# Patient Record
Sex: MALE | Race: WHITE | NOT HISPANIC OR LATINO | Employment: OTHER | URBAN - METROPOLITAN AREA
[De-identification: names, ages, dates, MRNs, and addresses within clinical notes are randomized per-mention and may not be internally consistent; named-entity substitution may affect disease eponyms.]

---

## 2017-01-23 ENCOUNTER — GENERIC CONVERSION - ENCOUNTER (OUTPATIENT)
Dept: OTHER | Facility: OTHER | Age: 77
End: 2017-01-23

## 2017-01-23 DIAGNOSIS — I34.0 NONRHEUMATIC MITRAL VALVE INSUFFICIENCY: ICD-10-CM

## 2017-01-23 DIAGNOSIS — J44.9 CHRONIC OBSTRUCTIVE PULMONARY DISEASE (HCC): ICD-10-CM

## 2017-01-23 DIAGNOSIS — R91.8 OTHER NONSPECIFIC ABNORMAL FINDING OF LUNG FIELD: ICD-10-CM

## 2017-01-23 DIAGNOSIS — E78.5 HYPERLIPIDEMIA: ICD-10-CM

## 2017-01-23 DIAGNOSIS — K21.9 GASTRO-ESOPHAGEAL REFLUX DISEASE WITHOUT ESOPHAGITIS: ICD-10-CM

## 2017-01-23 DIAGNOSIS — I10 ESSENTIAL (PRIMARY) HYPERTENSION: ICD-10-CM

## 2017-01-23 DIAGNOSIS — I25.10 ATHEROSCLEROTIC HEART DISEASE OF NATIVE CORONARY ARTERY WITHOUT ANGINA PECTORIS: ICD-10-CM

## 2017-01-24 ENCOUNTER — GENERIC CONVERSION - ENCOUNTER (OUTPATIENT)
Dept: OTHER | Facility: OTHER | Age: 77
End: 2017-01-24

## 2017-01-24 LAB
A/G RATIO (HISTORICAL): 1.5 (ref 1.1–2.5)
ALBUMIN SERPL BCP-MCNC: 4.3 G/DL (ref 3.5–4.8)
ALP SERPL-CCNC: 103 IU/L (ref 39–117)
ALT SERPL W P-5'-P-CCNC: 22 IU/L (ref 0–44)
AMBIG ABBREV CMP14 DEFAULT (HISTORICAL): NORMAL
AMBIG ABBREV LP DEFAULT (HISTORICAL): NORMAL
AST SERPL W P-5'-P-CCNC: 23 IU/L (ref 0–40)
BASOPHILS # BLD AUTO: 0 X10E3/UL (ref 0–0.2)
BASOPHILS # BLD AUTO: 1 %
BILIRUB SERPL-MCNC: 0.4 MG/DL (ref 0–1.2)
BUN SERPL-MCNC: 17 MG/DL (ref 8–27)
BUN/CREA RATIO (HISTORICAL): 17 (ref 10–22)
CALCIUM SERPL-MCNC: 9.4 MG/DL (ref 8.6–10.2)
CHLORIDE SERPL-SCNC: 99 MMOL/L (ref 96–106)
CHOLEST SERPL-MCNC: 154 MG/DL (ref 100–199)
CO2 SERPL-SCNC: 24 MMOL/L (ref 18–29)
CREAT SERPL-MCNC: 1 MG/DL (ref 0.76–1.27)
DEPRECATED RDW RBC AUTO: 14.4 % (ref 12.3–15.4)
EGFR AFRICAN AMERICAN (HISTORICAL): 84 ML/MIN/1.73
EGFR-AMERICAN CALC (HISTORICAL): 73 ML/MIN/1.73
EOSINOPHIL # BLD AUTO: 0.3 X10E3/UL (ref 0–0.4)
EOSINOPHIL # BLD AUTO: 4 %
GLUCOSE SERPL-MCNC: 92 MG/DL (ref 65–99)
HBA1C MFR BLD HPLC: 5.9 % (ref 4.8–5.6)
HCT VFR BLD AUTO: 39.5 % (ref 37.5–51)
HDLC SERPL-MCNC: 28 MG/DL
HGB BLD-MCNC: 13.7 G/DL (ref 12.6–17.7)
IMM.GRANULOCYTES (CD4/8) (HISTORICAL): 0 %
IMM.GRANULOCYTES (CD4/8) (HISTORICAL): 0 X10E3/UL (ref 0–0.1)
LDLC SERPL CALC-MCNC: 106 MG/DL (ref 0–99)
LYMPHOCYTES # BLD AUTO: 1.4 X10E3/UL (ref 0.7–3.1)
LYMPHOCYTES # BLD AUTO: 22 %
MCH RBC QN AUTO: 32.5 PG (ref 26.6–33)
MCHC RBC AUTO-ENTMCNC: 34.7 G/DL (ref 31.5–35.7)
MCV RBC AUTO: 94 FL (ref 79–97)
MONOCYTES # BLD AUTO: 0.6 X10E3/UL (ref 0.1–0.9)
MONOCYTES (HISTORICAL): 10 %
NEUTROPHILS # BLD AUTO: 4.1 X10E3/UL (ref 1.4–7)
NEUTROPHILS # BLD AUTO: 63 %
PLATELET # BLD AUTO: 390 X10E3/UL (ref 150–379)
POTASSIUM SERPL-SCNC: 4.8 MMOL/L (ref 3.5–5.2)
RBC (HISTORICAL): 4.21 X10E6/UL (ref 4.14–5.8)
SODIUM SERPL-SCNC: 139 MMOL/L (ref 134–144)
TOT. GLOBULIN, SERUM (HISTORICAL): 2.8 G/DL (ref 1.5–4.5)
TOTAL PROTEIN (HISTORICAL): 7.1 G/DL (ref 6–8.5)
TRIGL SERPL-MCNC: 102 MG/DL (ref 0–149)
VLDLC SERPL CALC-MCNC: 20 MG/DL (ref 5–40)
WBC # BLD AUTO: 6.4 X10E3/UL (ref 3.4–10.8)

## 2017-01-25 ENCOUNTER — ALLSCRIPTS OFFICE VISIT (OUTPATIENT)
Dept: OTHER | Facility: OTHER | Age: 77
End: 2017-01-25

## 2017-01-29 LAB
BACTERIA UR QL AUTO: ABNORMAL
BILIRUB UR QL STRIP: NEGATIVE
COLOR UR: YELLOW
COMMENT (HISTORICAL): CLEAR
CULTURE RESULT (HISTORICAL): ABNORMAL
FECAL OCCULT BLOOD DIAGNOSTIC (HISTORICAL): NEGATIVE
GLUCOSE (HISTORICAL): NEGATIVE
KETONES UR STRIP-MCNC: NEGATIVE MG/DL
LEUKOCYTE ESTERASE UR QL STRIP: ABNORMAL
MICROSCOPIC EXAMINATION (HISTORICAL): ABNORMAL
MISCELLANEOUS LAB TEST RESULT (HISTORICAL): ABNORMAL
MUCUS THREADS (HISTORICAL): PRESENT
NITRITE UR QL STRIP: NEGATIVE
NON-SQ EPI CELLS URNS QL MICRO: ABNORMAL /HPF
PH UR STRIP.AUTO: 6 [PH] (ref 5–7.5)
PROT UR STRIP-MCNC: NEGATIVE MG/DL
RBC (HISTORICAL): ABNORMAL /HPF
SP GR UR STRIP.AUTO: 1.02 (ref 1–1.03)
SUSCEP. REFLEX (HISTORICAL): ABNORMAL
URINALYSIS (UA) (HISTORICAL): ABNORMAL
UROBILINOGEN UR QL STRIP.AUTO: 0.2 EU/DL (ref 0.2–1)
WBC # BLD AUTO: ABNORMAL /HPF

## 2017-03-06 DIAGNOSIS — I65.29 OCCLUSION AND STENOSIS OF UNSPECIFIED CAROTID ARTERY: ICD-10-CM

## 2017-07-12 ENCOUNTER — ALLSCRIPTS OFFICE VISIT (OUTPATIENT)
Dept: OTHER | Facility: OTHER | Age: 77
End: 2017-07-12

## 2017-07-12 ENCOUNTER — TRANSCRIBE ORDERS (OUTPATIENT)
Dept: ADMINISTRATIVE | Facility: HOSPITAL | Age: 77
End: 2017-07-12

## 2017-07-12 ENCOUNTER — APPOINTMENT (OUTPATIENT)
Dept: RADIOLOGY | Facility: CLINIC | Age: 77
End: 2017-07-12
Payer: COMMERCIAL

## 2017-07-12 DIAGNOSIS — J44.9 CHRONIC OBSTRUCTIVE PULMONARY DISEASE (HCC): ICD-10-CM

## 2017-07-12 PROCEDURE — 71020 HB CHEST X-RAY 2VW FRONTAL&LATL: CPT

## 2017-07-19 ENCOUNTER — GENERIC CONVERSION - ENCOUNTER (OUTPATIENT)
Dept: OTHER | Facility: OTHER | Age: 77
End: 2017-07-19

## 2017-09-01 ENCOUNTER — GENERIC CONVERSION - ENCOUNTER (OUTPATIENT)
Dept: OTHER | Facility: OTHER | Age: 77
End: 2017-09-01

## 2017-10-28 NOTE — PROGRESS NOTES
Assessment    1  Hypertension (401 9) (I10)   2  Hyperlipidemia (272 4) (E78 5)   3  GERD without esophagitis (530 81) (K21 9)   4  Cerebrovascular accident (CVA) due to embolism (434 11) (I63 9)    Plan  GERD without esophagitis    · Protonix 40 MG Oral Tablet Delayed Release (Pantoprazole Sodium)  Hyperlipidemia    · Simvastatin 20 MG Oral Tablet  Hypertension    · Metoprolol Tartrate 50 MG Oral Tablet   · Begin a limited exercise program ; Status:Complete;   Done: 04KFY0756   · Begin or continue regular aerobic exercise  Gradually work up to at least 3 sessions of30 minutes of exercise a week ; Status:Complete;   Done: 45CFH5948   · Limit your use of alcohol to 2 drinks or cans of beer a day ; Status:Complete;   Done:27Jan2016   · Take your blood pressure twice a week  Record the numbers and bring them with you toyour appointments ; Status:Complete;   Done: 47EEH5663   · We recommend that you follow the Mediterranean diet ; Status:Complete;   Done:27Jan2016   · Call [phone] if: You become dizzy or lightheaded, especially when you stand upafter sitting for a while ; Status:Complete;   Done: 89UQR0415   · Call [phone] if: You develop double vision (see two of everything)  ;Status:Complete;   Done: 65FOS7705   · Call [phone] if: Your blood pressure is frequently higher than 140/90  ;Status:Complete;   Done: 27UVB5135   · Call 911 if: You experience a new kind of chest pain (angina) or pressure  ;Status:Complete;   Done: 57LMO6058   · Call 911 if: You have any symptoms of a stroke ; Status:Complete;   Done: 91IGX1559   · Seek Immediate Medical Attention if: You have a severe headache that will not go away  ;Status:Complete;   Done: 44OUH8165   · Seek Immediate Medical Attention if: Your blood pressure is greater than 250/120 for 2consecutive readings ; Status:Complete;   Done: 61YOH3788   · Follow-up visit in 6 months Evaluation and Treatment  Follow-up  Status: Hold For -Scheduling  Requested for: 83NAL7761    Discussion/Summary    79-year-old man with COPD hypertension recent CVAhas not been monitoring he is following with the cardiologistshows no neurologic sequelae of recent stroke his blood pressures controlledwill follow him along and see him every 6 months or sooner as neededreviewed his recent discharge summary diagnostic testing consultationsmake no changes in his overall medication or managementwas recently vaccinated with Prevnar 13 and the flu shot  Chief Complaint  3 month Checkup -- in [facility] earlier this month      History of Present Illness  79-year-old male here today one month following hospitalization for CVA and acute renal injuryhas returned home and is moderately self-sufficientis under treatment for the following ongoing problemsthis is a second stroke he takes medication is usually well-controlledhe is on a statin agent and his numbers are at goalreflux he takes a proton pump inhibitor and he is symptomatically controlledheart disease/carotid artery disease he follows with the cardiologistdependence unclear as to whether not he is quit smoking I doubt that he hasabuse he has a long history of alcohol abuse and his daughter monitors his intake   The patient is being seen for follow-up of gastroesophageal reflux disease  The patient reports doing well  There are no comorbid illnesses  He has had no significant interval events  The patient is currently asymptomatic  No associated symptoms are reported  Medications include omeprazole (Prilosec)  Medications:  the patient is adherent to his medication regimen, but-- he denies medication side effects  Disease management:  the patient is doing well with his goals  The patient states his hyperlipidemia has been under good control since the last visit  Comorbid Illnesses: coronary artery disease,-- carotid disease-- and-- hypertension  He has no significant interval events  Symptoms: The patient is currently asymptomatic   Associated symptoms include no focal neurologic deficits-- and-- no memory loss  Medications: the patient is adherent with his medication regimen  -- He denies medication side effects  Medication(s): a statin  The patient is doing well with his hyperlipidemia goals  the patient's LDL goal is 100 mg/dL  -- the patient's last LDL was 99 mg/dL  The patient presents for follow-up of essential hypertension  The patient states he has been stable with his blood pressure control since the last visit  Comorbid Illnesses: a stroke-- and-- coronary artery disease  He has no significant interval events  Symptoms: The patient is currently asymptomatic  Associated symptoms include memory loss, but-- no headache-- and-- no focal neurologic deficits  Home monitoring: The patient checks his blood pressure sporadically  Blood pressure control has been good  Lifestyle: Diet: He does not have a healthy diet  Weight Issues: He does not have any weight concerns  Exercise: He does not exercise regularly  Smoking: He uses tobacco Alcohol: He consumes alcohol  Drug Use: He denies drug use  Medications: the patient is adherent with his medication regimen  -- He denies medication side effects  Medication(s): a beta blocking agent-- and-- an ACE inhibitor  Disease Management: the patient is doing well with his blood pressure goals  Review of Systems   Constitutional: No fever or chills, feels well, no tiredness, no recent weight gain or weight loss  Eyes: No complaints of eye pain, no red eyes, no discharge from eyes, no itchy eyes  ENT: no complaints of earache, no hearing loss, no nosebleeds, no nasal discharge, no sore throat, no hoarseness  Cardiovascular: No complaints of slow heart rate, no fast heart rate, no chest pain, no palpitations, no leg claudication, no lower extremity  Respiratory: No complaints of shortness of breath, no wheezing, no cough, no SOB on exertion, no orthopnea or PND    Gastrointestinal: No complaints of abdominal pain, no constipation, no nausea or vomiting, no diarrhea or bloody stools  Genitourinary: No complaints of dysuria, no incontinence, no hesitancy, no nocturia, no genital lesion, no testicular pain  Musculoskeletal: No complaints of arthralgia, no myalgias, no joint swelling or stiffness, no limb pain or swelling  Integumentary: No complaints of skin rash or skin lesions, no itching, no skin wound, no dry skin  Neurological: No compliants of headache, no confusion, no convulsions, no numbness or tingling, no dizziness or fainting, no limb weakness, no difficulty walking  Psychiatric: Is not suicidal, no sleep disturbances, no anxiety or depression, no change in personality, no emotional problems  Endocrine: No complaints of proptosis, no hot flashes, no muscle weakness, no erectile dysfunction, no deepening of the voice, no feelings of weakness  Hematologic/Lymphatic: No complaints of swollen glands, no swollen glands in the neck, does not bleed easily, no easy bruising  Active Problems  1  Abdominal pain (789 00) (R10 9)   2  Abnormal chest x-ray with multiple lung nodules (793 19) (R91 8)   3  Alcohol dependence (303 90) (F10 20)   4  Benign prostatic hypertrophy without urinary obstruction (600 00) (N40 0)   5  Carotid artery stenosis (433 10) (I65 29)   6  Cerebrovascular accident (CVA) due to embolism (434 11) (I63 9)   7  Chronic sinusitis (473 9) (J32 9)   8  COPD (chronic obstructive pulmonary disease) (496) (J44 9)   9  Coronary artery disease (414 00) (I25 10)   10  Encounter for smoking cessation counseling (V65 42,305 1) (Z71 6,Z72 0)   11  GERD without esophagitis (530 81) (K21 9)   12  Hyperlipidemia (272 4) (E78 5)   13  Hypertension (401 9) (I10)   14  Lung mass (786 6) (R91 8)   15  Mitral regurgitation (424 0) (I34 0)   16  Pre-operative cardiovascular examination (V72 81) (Z01 810)   17  Tobacco use (305 1) (Z72 0)   18  Vasovagal syncope (780 2) (R55)    Past Medical History  1  History of A Fall Due To Slipping, Tripping, Or Stumbling (E885 9)   2  History of Arthritis (V13 4)   3  History of Cholecystitis without calculus (575 10) (K81 9)   4  History of Chronic calculous cholecystitis (574 10) (K80 10)   5  History of Closed Rib Fracture (807 00)   6  History of Colovesical Fistula (596 1)   7  History of Glaucoma screening (V80 1) (Z13 5)   8  History of Hematochezia (578 1) (K92 1)   9  History of diverticulitis of colon (V12 79) (Z87 19)   10  History of head injury (V15 59) (Z87 828)   11  History of low back pain (V13 59) (Z87 39)   12  History of nicotine dependence (V15 82) (Z87 891)   13  History of urinary frequency (V13 09) (Z87 898)   14  History of Influenza vaccine needed (V04 81) (Z23)   15  History of Laceration of scalp (873 0) (S01 01XA)   16  History of Need for pneumococcal vaccine (V03 82) (Z23)   17  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)   18  History of Uncomplicated alcohol abuse (305 00) (F10 10)    The active problems and past medical history were reviewed and updated today  Surgical History    1  History of Cholecystectomy Laparoscopic   2  History of Small Bowel Resection   3  History of Take-down Of Splenic Flexure In Conjunc With Partl Colectomy   4  History of Therapeutic Cystoscopy    The surgical history was reviewed and updated today  Family History  1  Family history of Diabetes Mellitus (V18 0)   2  Family history of Hypertension (V17 49)   3  Family history of Liver Cancer  4  Family history of Coronary Artery Disease (V17 49)  5  Family history of Heart Disease (V17 49)   6  Family history of Heart Disease (V17 49)  7  Family history of Heart Disease (V17 49)   8  Family history of Liver Cancer  9  Family history of Gastric Cancer (V16 0)    The family history was reviewed and updated today         Social History     · Alcohol Use (History)   · Current Every Day Smoker (305 1)   · Denied: History of Drug Use   · Marital History -    · Occupation: Retired   · Tobacco use (305 1) (Z72 0)  The social history was reviewed and updated today  The social history was reviewed and is unchanged  Current Meds   1  Aspirin 325 MG Oral Tablet; Take 1 tablet twice daily Recorded   2  Clopidogrel Bisulfate 75 MG Oral Tablet; TAKE 1 TABLET DAILY; Therapy: 92FLV6303 to Recorded   3  Folic Acid 1 MG Oral Tablet; Take 1 tablet daily; Therapy: (Huey David) to  Requested for: 54YSD9179 Recorded   4  Lisinopril 20 MG Oral Tablet; Take 1 tablet daily; Therapy: 35LZP7781 to (Evaluate:88Shj5502); Last Rx:30Std2135 Ordered   5  Metoprolol Tartrate 25 MG Oral Tablet; TAKE 1 TABLET TWICE DAILY; Therapy: 81QNE7192 to Recorded   6  Metoprolol Tartrate 50 MG Oral Tablet; Take 1 tablet twice daily  Requested for: 75Dtq2465; Last Rx:54Phs1973 Ordered   7  Pantoprazole Sodium 40 MG Oral Tablet Delayed Release; Take one tablet daily before breakfast; Therapy: 60UBN2746 to (Last Rx:47Cno9715)  Requested for: 42KNI7258 Ordered   8  Protonix 40 MG Oral Tablet Delayed Release; Take 1 tablet daily; Therapy: 31NFY2293 to Recorded   9  Simvastatin 20 MG Oral Tablet; TAKE 1 TABLET DAILY AT BEDTIME  Requested for: 00Qik6962; Last Rx:22Lxi3279 Ordered   10  Simvastatin 40 MG Oral Tablet; TAKE 1 TABLET DAILY; Therapy: 15HGQ6629 to (Evaluate:97Fyh6665) Recorded   11  Vitamin D3 1000 UNIT Oral Capsule; 3 capsules dfaily; Therapy: 00UIS0291 to Recorded    The medication list was reviewed and updated today  Allergies    1  No Known Drug Allergies    Vitals  Vital Signs [Data Includes: Current Encounter]    ** Printed in Appendix #1 below  Physical Exam   Constitutional  General appearance: Abnormal   chronically ill-- and-- appearance reflects stated age  Ears, Nose, Mouth, and Throat  Otoscopic examination: Tympanic membrance translucent with normal light reflex  Canals patent without erythema     Nasal mucosa, septum, and turbinates: Normal without edema or erythema  Oropharynx: Normal with no erythema, edema, exudate or lesions  Pulmonary  Auscultation of lungs: Abnormal   Auscultation of the lungs revealed decreased breath sounds diffusely  diffuse rhonchi bilaterally  Cardiovascular  Auscultation of heart: Normal rate and rhythm, normal S1 and S2, without murmurs  Examination of extremities for edema and/or varicosities: Normal    Carotid pulses: Normal    Abdomen  Abdomen: Non-tender, no masses  Liver and spleen: No hepatomegaly or splenomegaly  Lymphatic  Palpation of lymph nodes in neck: No lymphadenopathy  Musculoskeletal  Gait and station: Normal    Digits and nails: Normal without clubbing or cyanosis  Inspection/palpation of joints, bones, and muscles: Normal    Skin  Skin and subcutaneous tissue: Normal without rashes or lesions           Signatures   Electronically signed by : LAUREEN Bradley ; 2016  7:06PM EST                       (Author)    Appendix #1  Vital Signs Cory [place] Includes: Current Encounter]   Patient: Corbin Chavez ; : 1940; MRN: [id number]   Recorded: 47MHR5706 06:53PM Recorded: 43OXB4805 06:40PM Recorded: 63EYG1405 06:30PM   Heart Rate  66, R Radial    Pulse Quality  Regular    Systolic 900 362, RUE, Sitting    Diastolic 80 90, RUE, Sitting    Height   5 ft 4 in   Weight   141 lb    BMI Calculated   24 2   BSA Calculated   1 69

## 2018-01-11 NOTE — PROGRESS NOTES
History of Present Illness  Care Coordination Encounter Information:   Type of Encounter: Telephonic   Contact: Initial Contact    Spoke to Adult Child   Kiah Parent  Care Coordination  Nurse [facility]: I reached out to this family and Kiah Parent his daughter(identified in chart) responded as her number is his contact number  She said that he would not be willing to participate in [address] [place] [address] but that she would like my number in order to reach out if necessary  She does have concerns with his smoking as his PCP does and I offered suggestions all of which she said he would not agree to  She disclosed that he hasn't had "hard liquor in over 1 5 years" but smokes excessively d/t not drinking  She has my contact information and will reach out as necessary  JG      Active Problems    1  Age-related nuclear cataract of both eyes (366 16) (H25 13)   2  Alcohol dependence (303 90) (F10 20)   3  Benign prostatic hypertrophy without urinary obstruction (600 00) (N40 0)   4  Carotid artery stenosis (433 10) (I65 29)   5  Cerebrovascular accident (CVA) due to embolism (434 11) (I63 9)   6  Chronic sinusitis (473 9) (J32 9)   7  COPD (chronic obstructive pulmonary disease) (496) (J44 9)   8  Coronary artery disease (414 00) (I25 10)   9  GERD without esophagitis (530 81) (K21 9)   10  Hyperlipidemia (272 4) (E78 5)   11  Hypertension (401 9) (I10)   12  Lung mass (786 6) (R91 8)   13  Mitral regurgitation (424 0) (I34 0)   14  Other cerebrovascular disease (437 8) (I67 89)   15  Sciatica of right side (724 3) (M54 31)   16  Tobacco use (305 1) (Z72 0)   17  Vasovagal syncope (780 2) (R55)    Past Medical History    1  History of A Fall Due To Slipping, Tripping, Or Stumbling (E885 9)   2  History of Abnormal chest x-ray with multiple lung nodules (793 19) (R91 8)   3  History of Arthritis (V13 4)   4  History of Cholecystitis without calculus (575 10) (K81 9)   5  History of Chronic calculous cholecystitis (574 10) (K80 10)   6   History of Closed Rib Fracture (807 00)   7  History of Colovesical Fistula (596 1)   8  History of Encounter for smoking cessation counseling (V65 42,305 1) (Z71 6,Z72 0)   9  History of Glaucoma screening (V80 1) (Z13 5)   10  History of Hematochezia (578 1) (K92 1)   11  History of abdominal pain (V13 89) (Z87 898)   12  History of diverticulitis of colon (V12 79) (Z87 19)   13  History of head injury (V15 59) (Z87 828)   14  History of low back pain (V13 59) (Z87 39)   15  History of nicotine dependence (V15 82) (Z87 891)   16  History of urinary frequency (V13 09) (Z87 898)   17  History of Laceration of scalp (873 0) (S01 01XA)   18  History of Need for pneumococcal vaccine (V03 82) (Z23)   19  History of Pre-operative cardiovascular examination (V72 81) (Z01 810)   20  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)   21  History of Uncomplicated alcohol abuse (305 00) (F10 10)    Surgical History    1  History of Cholecystectomy Laparoscopic   2  History of Small Bowel Resection   3  History of Take-down Of Splenic Flexure In Conjunc With Partl Colectomy   4  History of Therapeutic Cystoscopy    Family History  Mother    1  Family history of Diabetes Mellitus (V18 0)   2  Family history of Hypertension (V17 49)   3  Family history of Liver Cancer  Father    4  Family history of Coronary Artery Disease (V17 49)   5  No family history of mental disorder  Sister    6  Family history of Heart Disease (V17 49)   7  Family history of Heart Disease (V17 49)  Brother    8  Family history of Heart Disease (V17 49)   9  Family history of Liver Cancer  Maternal Grandfather    10  Family history of Gastric Cancer (V16 0)    Social History    · History of Alcohol Use (History)   · Current Every Day Smoker (305 1)   · Denies alcohol use causing problems   · Denied: History of Drug Use   · Marital History -    · Occupation: Retired   · Tobacco use (305 1) (Z72 0)    Current Meds    1   Clopidogrel Bisulfate 75 MG Oral Tablet; TAKE 1 TABLET DAILY; Therapy: 18BIA8785 to (Evaluate:16Pzq9697)  Requested for: 45Zqi3772; Last   Rx:69Qgc2483 Ordered    2  Spiriva HandiHaler 18 MCG Inhalation Capsule; INHALE 1 CAPSULE Daily; Therapy: 80HRS4530 to (Last Rx:29Bgw7493)  Requested for: 17Ucg2987 Ordered    3  Pantoprazole Sodium 40 MG Oral Tablet Delayed Release (Protonix); Take one tablet   daily before breakfast;   Therapy: 33ZXB1765 to (Last Rx:06Oct2015)  Requested for: 84YKA1197 Ordered    4  Folic Acid 1 MG Oral Tablet; Take 1 tablet daily; Therapy: (Tess Linn) to  Requested for: 74LGN0791 Recorded    5  Simvastatin 40 MG Oral Tablet; TAKE 1 TABLET DAILY; Therapy: 68NRX2011 to (Evaluate:27Zzb0568)  Requested for: 90BCE5717; Last   Rx:72Nzo1338 Ordered    6  Lisinopril 20 MG Oral Tablet; take one tablet by mouth every day; Therapy: 97AVK5214 to (Evaluate:04Exl5714)  Requested for: 29NXK7281; Last   Rx:13Jun2017 Ordered   7  Metoprolol Tartrate 25 MG Oral Tablet; TAKE 1 TABLET TWICE DAILY; Therapy: 73KFX0696 to (Evaluate:65Xmr7662)  Requested for: 05ZAB4304; Last   Rx:18Mar2017 Ordered    8  Aspirin 325 MG Oral Tablet; Take 1 tablet twice daily Recorded   9  Vitamin D3 1000 UNIT Oral Capsule; 3 capsules dfaily; Therapy: 39ANQ9010 to Recorded    Allergies    1  No Known Drug Allergies    End of Encounter Meds    1  Clopidogrel Bisulfate 75 MG Oral Tablet; TAKE 1 TABLET DAILY; Therapy: 48TUO7779 to (Evaluate:63Tdr5732)  Requested for: 81Yav1507; Last   Rx:22Yux5729 Ordered    2  Spiriva HandiHaler 18 MCG Inhalation Capsule; INHALE 1 CAPSULE Daily; Therapy: 21HLY1289 to (Last Rx:22Frz5306)  Requested for: 46Arh2933 Ordered    3  Pantoprazole Sodium 40 MG Oral Tablet Delayed Release (Protonix); Take one tablet   daily before breakfast;   Therapy: 19SAH7555 to (Last Rx:06Oct2015)  Requested for: 83VLG5659 Ordered    4  Folic Acid 1 MG Oral Tablet; Take 1 tablet daily;    Therapy: (Tess Linn) to  Requested for: 94QSG7693 Recorded    5  Simvastatin 40 MG Oral Tablet; TAKE 1 TABLET DAILY; Therapy: 70QQQ5925 to (Evaluate:86Cwg2197)  Requested for: 99YRA3367; Last   Rx:96Lcz4875 Ordered    6  Lisinopril 20 MG Oral Tablet; take one tablet by mouth every day; Therapy: 36TYA5989 to (Evaluate:14Nob3971)  Requested for: 85YDA3313; Last   Rx:54Vkd7648 Ordered   7  Metoprolol Tartrate 25 MG Oral Tablet; TAKE 1 TABLET TWICE DAILY; Therapy: 75OXH7173 to (Evaluate:50Uqc2993)  Requested for: 83HGG0762; Last   Rx:75Vet9421 Ordered    8  Aspirin 325 MG Oral Tablet; Take 1 tablet twice daily Recorded   9  Vitamin D3 1000 UNIT Oral Capsule; 3 capsules dfaily; Therapy: 97LBP0945 to Recorded    Patient Care Team    Care Team Member Role Specialty Office Number   [facility] MD  Thoracic Surgery [phone] [address]  Family Medicine [phone]   Leif ZENDEJAS    Cardiology [phone]     Signatures   Electronically signed by : Jessica Briceño RN; Jul 19 2017  1:16PM EST                       (Author)

## 2018-01-13 VITALS
BODY MASS INDEX: 25.27 KG/M2 | SYSTOLIC BLOOD PRESSURE: 140 MMHG | DIASTOLIC BLOOD PRESSURE: 94 MMHG | HEIGHT: 64 IN | WEIGHT: 148 LBS | HEART RATE: 72 BPM

## 2018-01-14 VITALS
SYSTOLIC BLOOD PRESSURE: 140 MMHG | HEART RATE: 76 BPM | HEIGHT: 64 IN | DIASTOLIC BLOOD PRESSURE: 86 MMHG | BODY MASS INDEX: 25.1 KG/M2 | WEIGHT: 147 LBS

## 2018-03-26 ENCOUNTER — TELEPHONE (OUTPATIENT)
Dept: FAMILY MEDICINE CLINIC | Facility: CLINIC | Age: 78
End: 2018-03-26

## 2018-03-26 DIAGNOSIS — I73.9 PAD (PERIPHERAL ARTERY DISEASE) (HCC): Primary | ICD-10-CM

## 2018-03-26 RX ORDER — CLOPIDOGREL BISULFATE 75 MG/1
75 TABLET ORAL DAILY
Qty: 30 TABLET | Refills: 1 | Status: SHIPPED | OUTPATIENT
Start: 2018-03-26

## 2018-03-26 RX ORDER — CLOPIDOGREL BISULFATE 75 MG/1
1 TABLET ORAL DAILY
COMMUNITY
Start: 2016-01-27 | End: 2018-03-26 | Stop reason: SDUPTHER

## 2018-03-26 NOTE — TELEPHONE ENCOUNTER
Nicky patient's daughter advised script sent into [facility]

## 2018-08-24 DIAGNOSIS — I73.9 PAD (PERIPHERAL ARTERY DISEASE) (HCC): ICD-10-CM

## 2018-08-24 RX ORDER — CLOPIDOGREL BISULFATE 75 MG/1
TABLET ORAL
Qty: 30 TABLET | Refills: 1 | OUTPATIENT
Start: 2018-08-24